# Patient Record
Sex: MALE | Race: WHITE | URBAN - METROPOLITAN AREA
[De-identification: names, ages, dates, MRNs, and addresses within clinical notes are randomized per-mention and may not be internally consistent; named-entity substitution may affect disease eponyms.]

---

## 2017-03-09 ENCOUNTER — TELEPHONE (OUTPATIENT)
Dept: FAMILY MEDICINE CLINIC | Facility: CLINIC | Age: 47
End: 2017-03-09

## 2017-03-09 RX ORDER — CEPHALEXIN 500 MG/1
1 CAPSULE ORAL 2 TIMES DAILY
Refills: 0 | COMMUNITY
Start: 2017-02-20 | End: 2017-04-13 | Stop reason: ALTCHOICE

## 2017-03-09 RX ORDER — TRAMADOL HYDROCHLORIDE 50 MG/1
1-2 TABLET ORAL EVERY 4 HOURS PRN
Refills: 0 | COMMUNITY
Start: 2017-02-20 | End: 2017-04-13 | Stop reason: ALTCHOICE

## 2017-03-09 RX ORDER — ALPRAZOLAM 0.5 MG/1
3 TABLET ORAL DAILY PRN
COMMUNITY
Start: 2016-10-06

## 2017-03-09 RX ORDER — ALBUTEROL SULFATE 90 UG/1
1-2 AEROSOL, METERED RESPIRATORY (INHALATION)
COMMUNITY
Start: 2016-10-06

## 2017-03-09 RX ORDER — LISINOPRIL 10 MG/1
1 TABLET ORAL DAILY
Refills: 11 | COMMUNITY
Start: 2017-03-03

## 2017-03-09 RX ORDER — FLUTICASONE PROPIONATE 50 MCG
2 SPRAY, SUSPENSION (ML) NASAL DAILY
COMMUNITY
Start: 2017-01-05

## 2017-03-09 RX ORDER — FEXOFENADINE HCL 180 MG/1
1 TABLET ORAL DAILY PRN
COMMUNITY
Start: 2016-10-06

## 2017-03-09 RX ORDER — RISPERIDONE 1 MG/ML
0.25 SOLUTION ORAL 2 TIMES DAILY
COMMUNITY
Start: 2016-10-06 | End: 2017-04-13

## 2017-03-09 RX ORDER — BLOOD-GLUCOSE METER
EACH MISCELLANEOUS
Refills: 2 | COMMUNITY
Start: 2017-02-28

## 2017-03-09 RX ORDER — TRAZODONE HYDROCHLORIDE 50 MG/1
1 TABLET ORAL NIGHTLY PRN
COMMUNITY
Start: 2016-10-06 | End: 2017-04-13 | Stop reason: ALTCHOICE

## 2017-03-09 RX ORDER — ATORVASTATIN CALCIUM 20 MG/1
1 TABLET, FILM COATED ORAL DAILY
COMMUNITY
Start: 2016-10-06

## 2017-03-09 RX ORDER — FLUOXETINE HYDROCHLORIDE 40 MG/1
2 CAPSULE ORAL 2 TIMES DAILY
COMMUNITY
Start: 2016-10-06 | End: 2019-11-26 | Stop reason: DRUGHIGH

## 2017-03-09 RX ORDER — OMEPRAZOLE 20 MG/1
1 CAPSULE, DELAYED RELEASE ORAL DAILY
COMMUNITY
Start: 2016-10-06

## 2017-03-09 RX ORDER — DIVALPROEX SODIUM 500 MG/1
3 TABLET, DELAYED RELEASE ORAL DAILY
COMMUNITY
Start: 2016-10-06

## 2017-03-09 NOTE — TELEPHONE ENCOUNTER
Office notes printed from [address] and faxed to April at [facility]  This is patient's cpap DME    Dallas Peek Minor, 03/09/2017, 5:11 PM

## 2017-04-13 ENCOUNTER — OFFICE VISIT (OUTPATIENT)
Dept: FAMILY MEDICINE CLINIC | Facility: CLINIC | Age: 47
End: 2017-04-13

## 2017-04-13 VITALS
HEIGHT: 67 IN | WEIGHT: 273 LBS | RESPIRATION RATE: 16 BRPM | SYSTOLIC BLOOD PRESSURE: 118 MMHG | BODY MASS INDEX: 42.85 KG/M2 | TEMPERATURE: 98 F | HEART RATE: 88 BPM | DIASTOLIC BLOOD PRESSURE: 76 MMHG

## 2017-04-13 DIAGNOSIS — G47.33 OBSTRUCTIVE SLEEP APNEA: Primary | ICD-10-CM

## 2017-04-13 PROCEDURE — 99214 OFFICE O/P EST MOD 30 MIN: CPT | Performed by: FAMILY MEDICINE

## 2017-04-13 RX ORDER — ERGOCALCIFEROL 1.25 MG/1
50000 CAPSULE ORAL WEEKLY
Refills: 11 | COMMUNITY
Start: 2017-03-15 | End: 2017-12-27

## 2017-04-13 RX ORDER — ASENAPINE 10 MG/1
10 TABLET SUBLINGUAL NIGHTLY
COMMUNITY
End: 2019-11-26

## 2017-04-14 NOTE — PROGRESS NOTES
[facility]        HPI:   This is a 56year old male coming in for Patient presents with:  Obstructive Sleep Apnea (JULIO)    HPI:     Pt  PCP:  Radha Persaud  No referring provider defined for this encounter.   Patient C Used                        Alcohol Use: No              Family History:  History reviewed. No pertinent family history.   Allergies:    No Known Allergies        Current Meds:    Current Outpatient Prescriptions:  ergocalciferol 01137 units Oral Cap Take 5 Constitutional: Negative. Respiratory: Negative. Cardiovascular: Negative. Gastrointestinal: Negative. Skin: Negative. Neurological: Negative.         EXAM:   /76 mmHg  Pulse 88  Temp(Src) 98.2 °F (36.8 °C) (Tympanic)  Resp 16  Ht 67\ to refrain from driving when sleepy. COMPLIANCE is required by insurance for 4 hours a night most nights of the week. Recommend weight loss, and maintain and optimal BMI with Exercise 30 minutes most days of the week to target heart rate .      Advised

## 2017-11-28 ENCOUNTER — OFFICE VISIT (OUTPATIENT)
Dept: FAMILY MEDICINE CLINIC | Facility: CLINIC | Age: 47
End: 2017-11-28

## 2017-11-28 VITALS
HEIGHT: 67 IN | HEART RATE: 72 BPM | RESPIRATION RATE: 20 BRPM | SYSTOLIC BLOOD PRESSURE: 128 MMHG | DIASTOLIC BLOOD PRESSURE: 72 MMHG | BODY MASS INDEX: 36.63 KG/M2 | WEIGHT: 233.38 LBS | TEMPERATURE: 98 F

## 2017-11-28 DIAGNOSIS — G47.33 OBSTRUCTIVE SLEEP APNEA: Primary | ICD-10-CM

## 2017-11-28 PROCEDURE — 99214 OFFICE O/P EST MOD 30 MIN: CPT | Performed by: FAMILY MEDICINE

## 2017-11-28 NOTE — PROGRESS NOTES
[facility]        HPI:   This is a 56year old male coming in for Patient presents with:  Obstructive Sleep Apnea (JULIO): 6 month sleep follow up      HPI:   Got a job in may and getting home at 1 am, and then crashin Family History:  Family History   Problem Relation Age of Onset   • No Known Problems Father    • Heart Disorder Mother      Allergies:    No Known Allergies        Current Meds:    Current Outpatient Prescriptions:  asenapine maleate (SAPHRIS) 5 MG Lna Constitutional: Negative. HENT: Negative. Eyes: Negative. Respiratory: Positive for apnea. Cardiovascular: Negative. Neurological: Negative. Psychiatric/Behavioral: Positive for sleep disturbance.        EXAM:   /72 (BP Location: L 1. Obstructive sleep apnea  Rest, fluids, hydrate,   Use mucinex DM for nasal congestin and cough. Vitamin c, zinc, and garlic to help your immune system. Tylenol and ibuprofen as appropriate. Call if not improving in 10-14 days to be re-seen.   Sal

## 2017-12-27 ENCOUNTER — OFFICE VISIT (OUTPATIENT)
Dept: FAMILY MEDICINE CLINIC | Facility: CLINIC | Age: 47
End: 2017-12-27

## 2017-12-27 VITALS
HEIGHT: 68 IN | SYSTOLIC BLOOD PRESSURE: 110 MMHG | DIASTOLIC BLOOD PRESSURE: 68 MMHG | RESPIRATION RATE: 16 BRPM | HEART RATE: 72 BPM | BODY MASS INDEX: 34.74 KG/M2 | TEMPERATURE: 98 F | WEIGHT: 229.19 LBS

## 2017-12-27 DIAGNOSIS — G47.33 OBSTRUCTIVE SLEEP APNEA: Primary | ICD-10-CM

## 2017-12-27 PROCEDURE — 94660 CPAP INITIATION&MGMT: CPT | Performed by: NURSE PRACTITIONER

## 2017-12-27 RX ORDER — A/SINGAPORE/GP1908/2015 IVR-180 (H1N1) (AN A/MICHIGAN/45/2015-LIKE VIRUS), A/SINGAPORE/GP2050/2015 (H3N2) (AN A/HONG KONG/4801/2014 - LIKE VIRUS), B/UTAH/9/2014 (A B/PHUKET/3073/2013-LIKE VIRUS), B/HONG KONG/259/2010 (A B/BRISBANE/60/08-LIKE VIRUS) 15; 15; 15; 15 UG/.5ML; UG/.5ML; UG/.5ML; UG/.5ML
INJECTION, SUSPENSION INTRAMUSCULAR
Refills: 0 | COMMUNITY
Start: 2017-12-15 | End: 2017-12-27 | Stop reason: ALTCHOICE

## 2017-12-27 NOTE — PATIENT INSTRUCTIONS
Recheck in one month. Increase compliance. Heat temp increased to 2. Let us know if wants higher. Warned if still with sleep apnea and not using CPAP has 7 fold increased risk and heart attack, stroke, abnormal heart rhythm, and death.   Increased r

## 2017-12-27 NOTE — PROGRESS NOTES
[facility]        HPI:   This is a 56year old male coming in for Patient presents with:  Obstructive Sleep Apnea (JULIO): 1 month f/u       HPI:   Present for CPAP follow-up.  Is not using the machine regularly due to Prescriptions:  asenapine maleate (SAPHRIS) 5 MG Sublingual SL Tab Place 5 mg under the tongue nightly.  Disp:  Rfl:    Albuterol Sulfate HFA (VENTOLIN HFA) 108 (90 Base) MCG/ACT Inhalation Aero Soln Inhale 1-2 puffs into the lungs every 4 to 6 hours as nee Left arm, Patient Position: Sitting, Cuff Size: large)   Pulse 72   Temp 98.2 °F (36.8 °C) (Temporal)   Resp 16   Ht 68\"   Wt 229 lb 3.2 oz   BMI 34.85 kg/m²  Estimated body mass index is 34.85 kg/m² as calculated from the following:    Height as of this accidents. Advised to refrain from driving when sleepy. Advised if still with sleep apnea and not using CPAP has a  7 fold increase in risk of heart attack, stroke, abnormal heart rhythm  and death,  increased risk of driving accidents.    Advised

## 2018-01-29 ENCOUNTER — OFFICE VISIT (OUTPATIENT)
Dept: FAMILY MEDICINE CLINIC | Facility: CLINIC | Age: 48
End: 2018-01-29

## 2018-01-29 VITALS
DIASTOLIC BLOOD PRESSURE: 76 MMHG | HEIGHT: 68 IN | RESPIRATION RATE: 16 BRPM | TEMPERATURE: 97 F | HEART RATE: 72 BPM | BODY MASS INDEX: 35.86 KG/M2 | SYSTOLIC BLOOD PRESSURE: 110 MMHG | WEIGHT: 236.63 LBS

## 2018-01-29 DIAGNOSIS — G47.33 OBSTRUCTIVE SLEEP APNEA: Primary | ICD-10-CM

## 2018-01-29 PROCEDURE — 94660 CPAP INITIATION&MGMT: CPT | Performed by: NURSE PRACTITIONER

## 2018-01-29 RX ORDER — ERGOCALCIFEROL 1.25 MG/1
CAPSULE ORAL
Refills: 11 | COMMUNITY
Start: 2018-01-06 | End: 2019-05-28

## 2018-01-29 RX ORDER — IBUPROFEN 800 MG/1
TABLET ORAL
COMMUNITY
Start: 2018-01-06 | End: 2019-11-26 | Stop reason: ALTCHOICE

## 2018-01-29 RX ORDER — CYCLOBENZAPRINE HCL 5 MG
5 TABLET ORAL AS NEEDED
COMMUNITY
Start: 2018-01-06 | End: 2019-11-26 | Stop reason: ALTCHOICE

## 2018-01-29 NOTE — PROGRESS NOTES
[facility]        HPI:   This is a 56year old male coming in for Patient presents with:  Obstructive Sleep Apnea (JLUIO): Sleep compliance f/u      HPI: Patient is present to review his sleep compliance.   States he ha Prescriptions:  Cyclobenzaprine HCl 5 MG Oral Tab Take 5 mg by mouth as needed. Disp:  Rfl:    asenapine maleate (SAPHRIS) 5 MG Sublingual SL Tab Place 5 mg under the tongue nightly.  Disp:  Rfl:    Albuterol Sulfate HFA (VENTOLIN HFA) 108 (90 Base) MCG/ACT Cardiovascular: Negative. Musculoskeletal: Negative. Skin: Negative. Neurological: Negative. Psychiatric/Behavioral: Negative.         EXAM:   /76   Pulse 72   Temp (!) 96.8 °F (36 °C) (Tympanic)   Resp 16   Ht 68\"   Wt 236 lb 9.6 oz sooner if problems. Warned if still with sleep apnea and not using CPAP has 7 fold increased risk and heart attack, stroke, abnormal heart rhythm, and death. Increased risk of driving accidents. Advised to refrain from driving when sleepy.          [facility]

## 2018-01-29 NOTE — PATIENT INSTRUCTIONS
Must work on improving compliance. Do not take the cyclobenzaprine without using the machine. Recheck in 1 month, sooner if problems.     Warned if still with sleep apnea and not using CPAP has 7 fold increased risk and heart attack, stroke, abnormal hear

## 2018-02-26 ENCOUNTER — OFFICE VISIT (OUTPATIENT)
Dept: FAMILY MEDICINE CLINIC | Facility: CLINIC | Age: 48
End: 2018-02-26

## 2018-02-26 VITALS
BODY MASS INDEX: 35.31 KG/M2 | HEART RATE: 68 BPM | HEIGHT: 68 IN | DIASTOLIC BLOOD PRESSURE: 72 MMHG | SYSTOLIC BLOOD PRESSURE: 126 MMHG | TEMPERATURE: 98 F | WEIGHT: 233 LBS

## 2018-02-26 DIAGNOSIS — G47.33 OBSTRUCTIVE SLEEP APNEA: Primary | ICD-10-CM

## 2018-02-26 PROCEDURE — 99213 OFFICE O/P EST LOW 20 MIN: CPT | Performed by: NURSE PRACTITIONER

## 2018-02-26 NOTE — PATIENT INSTRUCTIONS
Continue using CPAP. Recheck in 3 months. Sooner if needed. Warned if still with sleep apnea and not using CPAP has 7 fold increased risk and heart attack, stroke, abnormal heart rhythm, and death. Increased risk of driving accidents.   Advised to

## 2018-02-26 NOTE — PROGRESS NOTES
[facility]        HPI:   This is a 53year old male coming in for Patient presents with: Follow - Up: sleep - 1 month      HPI: Saqib Sandoval is present to review his CPAP compliance. He is now compliant using CPAP.   Sta Outpatient Prescriptions:  ergocalciferol 10675 units Oral Cap TK 1 C PO Q WEEK Disp:  Rfl: 11   ibuprofen 800 MG Oral Tab  Disp:  Rfl:    asenapine maleate (SAPHRIS) 5 MG Sublingual SL Tab Place 5 mg under the tongue nightly.  Disp:  Rfl:    Albuterol Sulf Negative. Cardiovascular: Negative. Musculoskeletal: Negative. Skin: Negative. Neurological: Negative. Psychiatric/Behavioral: Positive for sleep disturbance.        EXAM:   /72 (BP Location: Left arm, Patient Position: Sitting, Cuff Si Recheck in 3 months. Sooner if needed. Warned if still with sleep apnea and not using CPAP has 7 fold increased risk and heart attack, stroke, abnormal heart rhythm, and death. Increased risk of driving accidents.   Advised to refrain from driving

## 2018-10-20 RX ORDER — FLUTICASONE PROPIONATE 50 MCG
SPRAY, SUSPENSION (ML) NASAL
Refills: 0 | OUTPATIENT
Start: 2018-10-20

## 2019-05-28 ENCOUNTER — OFFICE VISIT (OUTPATIENT)
Dept: FAMILY MEDICINE CLINIC | Facility: CLINIC | Age: 49
End: 2019-05-28
Payer: MEDICARE

## 2019-05-28 VITALS
WEIGHT: 264.19 LBS | TEMPERATURE: 97 F | DIASTOLIC BLOOD PRESSURE: 78 MMHG | HEART RATE: 70 BPM | SYSTOLIC BLOOD PRESSURE: 126 MMHG | BODY MASS INDEX: 40.04 KG/M2 | RESPIRATION RATE: 18 BRPM | HEIGHT: 68 IN | OXYGEN SATURATION: 97 %

## 2019-05-28 DIAGNOSIS — R09.02 HYPOXEMIA: ICD-10-CM

## 2019-05-28 DIAGNOSIS — J45.50 SEVERE PERSISTENT ASTHMA WITHOUT COMPLICATION: ICD-10-CM

## 2019-05-28 DIAGNOSIS — J12.9 VIRAL PNEUMONIA: ICD-10-CM

## 2019-05-28 DIAGNOSIS — G47.33 OBSTRUCTIVE SLEEP APNEA: Primary | ICD-10-CM

## 2019-05-28 PROCEDURE — 99214 OFFICE O/P EST MOD 30 MIN: CPT | Performed by: FAMILY MEDICINE

## 2019-05-28 RX ORDER — BUPROPION HYDROCHLORIDE 150 MG/1
150 TABLET ORAL DAILY
COMMUNITY
Start: 2019-04-05 | End: 2019-11-26 | Stop reason: DRUGHIGH

## 2019-05-28 NOTE — PROGRESS NOTES
[facility]  [facility]        HPI:   This is a 51year old male coming in for Patient presents with:  Obstructive Sleep Apnea (JULIO): f/u      HPI: he was hospitalized in April and hospitalized for 6 days.    He is cleaning his cp Depression    • Diabetes ([facility] [address]    • Esophageal reflux    • Obesity    • Sleep apnea      No past surgical history on file.   Social History:  Social History    Social History Narrative      Not on file    Social History    Tobacco Use      Smoking status: N at bedtime Disp:  Rfl: 2   lisinopril 10 MG Oral Tab Take 1 tablet by mouth daily. Disp:  Rfl: 11   MetFORMIN HCl 500 MG Oral Tab Take 1 tablet by mouth 2 (two) times daily.  Disp:  Rfl: 5      Counseling given: Not Answered         Problem List:  Patient A are normal.   Neck: Normal range of motion. Neck supple. No thyromegaly present. Cardiovascular: Normal rate and regular rhythm. Exam reveals no friction rub. No murmur heard.   Pulmonary/Chest: Effort normal and breath sounds normal. No respiratory dis Prescriptions      No prescriptions requested or ordered in this encounter       Outcome: Parent verbalizes understanding. Parent is notified to call with any questions, complications, allergies, or worsening or changing symptoms.   Parent is to call with a

## 2019-11-26 ENCOUNTER — OFFICE VISIT (OUTPATIENT)
Dept: FAMILY MEDICINE CLINIC | Facility: CLINIC | Age: 49
End: 2019-11-26
Payer: MEDICARE

## 2019-11-26 VITALS
OXYGEN SATURATION: 99 % | BODY MASS INDEX: 39.4 KG/M2 | HEART RATE: 91 BPM | TEMPERATURE: 98 F | DIASTOLIC BLOOD PRESSURE: 72 MMHG | WEIGHT: 260 LBS | HEIGHT: 68 IN | SYSTOLIC BLOOD PRESSURE: 118 MMHG

## 2019-11-26 DIAGNOSIS — G47.33 OBSTRUCTIVE SLEEP APNEA: Primary | ICD-10-CM

## 2019-11-26 DIAGNOSIS — F25.9 SCHIZOAFFECTIVE DISORDER, UNSPECIFIED TYPE (HCC): ICD-10-CM

## 2019-11-26 PROCEDURE — 99214 OFFICE O/P EST MOD 30 MIN: CPT | Performed by: FAMILY MEDICINE

## 2019-11-26 RX ORDER — FLUOXETINE HYDROCHLORIDE 40 MG/1
80 CAPSULE ORAL DAILY
COMMUNITY
Start: 2019-11-15

## 2019-11-26 RX ORDER — BUPROPION HYDROCHLORIDE 300 MG/1
300 TABLET ORAL
COMMUNITY
Start: 2019-11-15

## 2019-11-26 RX ORDER — RISPERIDONE 2 MG/1
2 TABLET, FILM COATED ORAL NIGHTLY
COMMUNITY
Start: 2019-11-15

## 2019-11-26 NOTE — PROGRESS NOTES
[facility]  [facility]        HPI:   This is a 51year old male coming in for Patient presents with:  Sleep Problem      HPI:    he is cleaning his equipment regularly. Changing hoses mask and filters on time.    He is going to Medical History:   Diagnosis Date   • Allergic rhinitis    • Anxiety    • Asthma    • Depression    • Diabetes ([facility] [address]    • Esophageal reflux    • Obesity    • Sleep apnea      No past surgical history on file.   Social History:  Social History    Patient fernandez mouth daily. 11   • MetFORMIN HCl 500 MG Oral Tab Take 1 tablet by mouth 2 (two) times daily.   5      Counseling given: Not Answered         Problem List:  Patient Active Problem List:     Dysthymic disorder     Asthma     Familial multiple lipoprotein-ty rate, regular rhythm and normal heart sounds. Pulmonary/Chest: Effort normal and breath sounds normal.   Abdominal: Soft. Bowel sounds are normal. Musculoskeletal: Normal range of motion. General: No edema.      Neurological: He is alert and orien  Please excuse any grammatical errors. Call my office if you have any questions regarding this note.  \"     Jonn Duarte MD  11/26/2019  9:34 AM

## 2019-12-02 ENCOUNTER — TELEPHONE (OUTPATIENT)
Dept: FAMILY MEDICINE CLINIC | Facility: CLINIC | Age: 49
End: 2019-12-02

## 2019-12-02 NOTE — TELEPHONE ENCOUNTER
Spoke with patient. States this was done while he was sleeping. States he cannot say for sure what time he went to bed but it is usually sometime between 10-11pm.  Patient states he will do another study if Dr. Taisha Jacques wants him to. Please advise.

## 2019-12-02 NOTE — TELEPHONE ENCOUNTER
----- Message from Kanwal Pugh sent at 12/2/2019 11:51 AM CST -----  [facility]

## 2019-12-02 NOTE — TELEPHONE ENCOUNTER
Spoke with Liliana Noriega at [facility] who states this was daytime trend that was done at the hospital.  States patient had an overnight trend done in May, 2018. Liliana Noriega will fax the results for Dr. Mauro Thakkar to review.

## 2019-12-02 NOTE — TELEPHONE ENCOUNTER
Per last OV notes, patient was to contact [facility] to have his Overnight Oxygen Trend sent to our office. Received a fax from [facility] this morning with a daytime O2 trend done on 3/29/2019. Per Dr. Salomon Mendez notation \"Is patient wearing daytime O2?

## 2019-12-27 NOTE — TELEPHONE ENCOUNTER
Have not received overnight oxygenation trend. Please call [facility]. To obtain. If unable call [employer].

## 2020-01-02 NOTE — TELEPHONE ENCOUNTER
Finally received overnight oxygenation trend from [facility]. Pt with less than 5 minutes less than 89%   Does not need oxygen with his cpap.    Continue present managment

## 2020-05-11 ENCOUNTER — VIRTUAL PHONE E/M (OUTPATIENT)
Dept: FAMILY MEDICINE CLINIC | Facility: CLINIC | Age: 50
End: 2020-05-11
Payer: MEDICARE

## 2020-05-11 DIAGNOSIS — G47.33 OBSTRUCTIVE SLEEP APNEA: Primary | ICD-10-CM

## 2020-05-11 DIAGNOSIS — D64.9 ANEMIA, UNSPECIFIED TYPE: ICD-10-CM

## 2020-05-11 DIAGNOSIS — F34.1 DYSTHYMIC DISORDER: ICD-10-CM

## 2020-05-11 DIAGNOSIS — E55.9 VITAMIN D DEFICIENCY: ICD-10-CM

## 2020-05-11 PROCEDURE — 99443 PHONE E/M BY PHYS 21-30 MIN: CPT | Performed by: FAMILY MEDICINE

## 2020-05-11 NOTE — PROGRESS NOTES
[facility]  [facility]        HPI:   This is a 53year old male coming in for No chief complaint on file. HPI:  He is having some troubles with [place] of [place].    He has some dry mouth and difficulties with being • No Known Problems Father    • Heart Disorder Mother      Allergies:  No Known Allergies  Current Meds:  Current Outpatient Medications   Medication Sig Dispense Refill   • buPROPion HCl ER, XL, 300 MG Oral Tablet 24 Hr Take 300 mg by mouth daily with b Psychiatric/Behavioral: Positive for sleep disturbance. EXAM:   There were no vitals taken for this visit. Estimated body mass index is 39.53 kg/m² as calculated from the following:    Height as of 11/26/19: 68\".     Weight as of 11/26/19: 260 lb ( is required by insurance for 4 hours a night most nights of the week. Recommend weight loss, and maintain and optimal BMI with Exercise 30 minutes most days of the week to target heart rate .       Advised patient to change filters,masks,hoses  and tubes a

## 2021-03-02 ENCOUNTER — VIRTUAL PHONE E/M (OUTPATIENT)
Dept: FAMILY MEDICINE CLINIC | Facility: CLINIC | Age: 51
End: 2021-03-02
Payer: MEDICARE

## 2021-03-02 VITALS — WEIGHT: 270 LBS | BODY MASS INDEX: 41 KG/M2

## 2021-03-02 DIAGNOSIS — E78.49 FAMILIAL MULTIPLE LIPOPROTEIN-TYPE HYPERLIPIDEMIA: ICD-10-CM

## 2021-03-02 DIAGNOSIS — E55.9 VITAMIN D DEFICIENCY: ICD-10-CM

## 2021-03-02 DIAGNOSIS — G25.81 RLS (RESTLESS LEGS SYNDROME): ICD-10-CM

## 2021-03-02 DIAGNOSIS — F25.9 SCHIZOAFFECTIVE DISORDER, UNSPECIFIED TYPE (HCC): ICD-10-CM

## 2021-03-02 DIAGNOSIS — F34.1 DYSTHYMIC DISORDER: ICD-10-CM

## 2021-03-02 DIAGNOSIS — G47.33 OBSTRUCTIVE SLEEP APNEA: Primary | ICD-10-CM

## 2021-03-02 DIAGNOSIS — J45.909 UNCOMPLICATED ASTHMA, UNSPECIFIED ASTHMA SEVERITY, UNSPECIFIED WHETHER PERSISTENT: ICD-10-CM

## 2021-03-02 PROCEDURE — 99214 OFFICE O/P EST MOD 30 MIN: CPT | Performed by: FAMILY MEDICINE

## 2021-03-02 RX ORDER — PRAZOSIN HYDROCHLORIDE 1 MG/1
1 CAPSULE ORAL NIGHTLY
COMMUNITY
Start: 2021-02-04

## 2021-03-02 RX ORDER — ROPINIROLE 0.25 MG/1
1 TABLET, FILM COATED ORAL NIGHTLY
COMMUNITY
Start: 2021-02-27

## 2021-03-02 NOTE — PROGRESS NOTES
[facility]        HPI:   This is a 49year old male coming in for Patient presents with: Follow - Up: sleep      HPI: He is using his cpap nightly and going to bed earlier now that george is living with him.  And get History    Social History Narrative      Not on file    Social History    Tobacco Use      Smoking status: Never Smoker      Smokeless tobacco: Never Used    Alcohol use: No      Alcohol/week: 0.0 standard drinks    Drug use: No    Family History:  Family Dysthymic disorder     Asthma     Familial multiple lipoprotein-type hyperlipidemia     Schizoaffective disorder ([facility] [address]     Obstructive sleep apnea     Vitamin D deficiency      REVIEW OF SYSTEMS:   Review of Systems   Constitutional: Negative.     HENT: Neg with sleep apnea and not using CPAP has a  7 fold increase in risk of heart attack, stroke, abnormal heart rhythm  and death,  increased risk of driving accidents. Advised to refrain from driving when sleepy.     COMPLIANCE is required by insurance for 4 This note was created utilizing Dragon speech recognition software. Please excuse any grammatical errors. Call my office if you have any questions regarding this note.  \"     Deonna Cohen MD  3/2/2021  11:50 AM

## 2021-03-02 NOTE — PATIENT INSTRUCTIONS
Have primary care fax me:   CMP,   TSH,   T4,   CBC,  Iron profile with ferritin. b12 level  Vitamin D level.        Advised if still with sleep apnea and not using CPAP has a  7 fold increase in risk of heart attack, stroke, abnormal heart rhythm  and de

## 2022-07-05 ENCOUNTER — PATIENT OUTREACH (OUTPATIENT)
Dept: FAMILY MEDICINE CLINIC | Facility: CLINIC | Age: 52
End: 2022-07-05

## 2022-07-20 ENCOUNTER — PATIENT OUTREACH (OUTPATIENT)
Dept: FAMILY MEDICINE CLINIC | Facility: CLINIC | Age: 52
End: 2022-07-20

## 2022-07-20 NOTE — PROGRESS NOTES
left 2nd message with daughter reaching out to help schedule sleep follow up appointment, last seen 03-02-21

## 2022-07-29 ENCOUNTER — PATIENT OUTREACH (OUTPATIENT)
Dept: FAMILY MEDICINE CLINIC | Facility: CLINIC | Age: 52
End: 2022-07-29

## 2022-07-29 NOTE — PROGRESS NOTES
Patient is due for an appointment with ANKIT Rogers, or Dr Phyllis Phillips for sleep management. Last sleep management visit was 3/2/2021. Previous outreach contact was 2022 & 2022. Letter mailed to patient.

## 2022-08-29 ENCOUNTER — LAB ENCOUNTER (OUTPATIENT)
Dept: LAB | Age: 52
End: 2022-08-29
Attending: FAMILY MEDICINE
Payer: MEDICARE

## 2022-08-29 ENCOUNTER — OFFICE VISIT (OUTPATIENT)
Dept: FAMILY MEDICINE CLINIC | Facility: CLINIC | Age: 52
End: 2022-08-29
Payer: MEDICARE

## 2022-08-29 VITALS
DIASTOLIC BLOOD PRESSURE: 66 MMHG | BODY MASS INDEX: 37.28 KG/M2 | HEIGHT: 68 IN | WEIGHT: 246 LBS | RESPIRATION RATE: 18 BRPM | HEART RATE: 81 BPM | TEMPERATURE: 97 F | SYSTOLIC BLOOD PRESSURE: 118 MMHG | OXYGEN SATURATION: 98 %

## 2022-08-29 DIAGNOSIS — G25.81 RLS (RESTLESS LEGS SYNDROME): ICD-10-CM

## 2022-08-29 DIAGNOSIS — E55.9 VITAMIN D DEFICIENCY: ICD-10-CM

## 2022-08-29 DIAGNOSIS — G47.33 OBSTRUCTIVE SLEEP APNEA: Primary | ICD-10-CM

## 2022-08-29 DIAGNOSIS — M25.521 PAIN IN RIGHT ELBOW: ICD-10-CM

## 2022-08-29 DIAGNOSIS — E66.01 MORBID (SEVERE) OBESITY DUE TO EXCESS CALORIES (HCC): ICD-10-CM

## 2022-08-29 DIAGNOSIS — G47.19 EXCESSIVE DAYTIME SLEEPINESS: ICD-10-CM

## 2022-08-29 DIAGNOSIS — G47.33 OBSTRUCTIVE SLEEP APNEA: ICD-10-CM

## 2022-08-29 DIAGNOSIS — F25.9 SCHIZOAFFECTIVE DISORDER, UNSPECIFIED TYPE (HCC): ICD-10-CM

## 2022-08-29 DIAGNOSIS — F41.9 ANXIETY DISORDER, UNSPECIFIED TYPE: ICD-10-CM

## 2022-08-29 LAB
ALBUMIN SERPL-MCNC: 3.6 G/DL (ref 3.4–5)
ALBUMIN/GLOB SERPL: 0.9 {RATIO} (ref 1–2)
ALP LIVER SERPL-CCNC: 103 U/L
ALT SERPL-CCNC: 70 U/L
ANION GAP SERPL CALC-SCNC: 4 MMOL/L (ref 0–18)
AST SERPL-CCNC: 35 U/L (ref 15–37)
BASOPHILS # BLD AUTO: 0.06 X10(3) UL (ref 0–0.2)
BASOPHILS NFR BLD AUTO: 0.9 %
BILIRUB SERPL-MCNC: 0.4 MG/DL (ref 0.1–2)
BUN BLD-MCNC: 9 MG/DL (ref 7–18)
CALCIUM BLD-MCNC: 9 MG/DL (ref 8.5–10.1)
CHLORIDE SERPL-SCNC: 103 MMOL/L (ref 98–112)
CO2 SERPL-SCNC: 26 MMOL/L (ref 21–32)
CREAT BLD-MCNC: 1.02 MG/DL
DEPRECATED HBV CORE AB SER IA-ACNC: 23.4 NG/ML
EOSINOPHIL # BLD AUTO: 0.36 X10(3) UL (ref 0–0.7)
EOSINOPHIL NFR BLD AUTO: 5.5 %
ERYTHROCYTE [DISTWIDTH] IN BLOOD BY AUTOMATED COUNT: 12.7 %
FASTING STATUS PATIENT QL REPORTED: NO
GFR SERPLBLD BASED ON 1.73 SQ M-ARVRAT: 89 ML/MIN/1.73M2 (ref 60–?)
GLOBULIN PLAS-MCNC: 3.8 G/DL (ref 2.8–4.4)
GLUCOSE BLD-MCNC: 258 MG/DL (ref 70–99)
HCT VFR BLD AUTO: 43.7 %
HGB BLD-MCNC: 14 G/DL
IMM GRANULOCYTES # BLD AUTO: 0.02 X10(3) UL (ref 0–1)
IMM GRANULOCYTES NFR BLD: 0.3 %
IRON SATN MFR SERPL: 17 %
IRON SERPL-MCNC: 76 UG/DL
LDH SERPL L TO P-CCNC: 192 U/L
LYMPHOCYTES # BLD AUTO: 2.95 X10(3) UL (ref 1–4)
LYMPHOCYTES NFR BLD AUTO: 44.9 %
MCH RBC QN AUTO: 29.2 PG (ref 26–34)
MCHC RBC AUTO-ENTMCNC: 32 G/DL (ref 31–37)
MCV RBC AUTO: 91.2 FL
MONOCYTES # BLD AUTO: 0.52 X10(3) UL (ref 0.1–1)
MONOCYTES NFR BLD AUTO: 7.9 %
NEUTROPHILS # BLD AUTO: 2.66 X10 (3) UL (ref 1.5–7.7)
NEUTROPHILS # BLD AUTO: 2.66 X10(3) UL (ref 1.5–7.7)
NEUTROPHILS NFR BLD AUTO: 40.5 %
OSMOLALITY SERPL CALC.SUM OF ELEC: 284 MOSM/KG (ref 275–295)
PLATELET # BLD AUTO: 347 10(3)UL (ref 150–450)
POTASSIUM SERPL-SCNC: 3.9 MMOL/L (ref 3.5–5.1)
PROT SERPL-MCNC: 7.4 G/DL (ref 6.4–8.2)
RBC # BLD AUTO: 4.79 X10(6)UL
SODIUM SERPL-SCNC: 133 MMOL/L (ref 136–145)
TIBC SERPL-MCNC: 459 UG/DL (ref 240–450)
TRANSFERRIN SERPL-MCNC: 308 MG/DL (ref 200–360)
TSI SER-ACNC: 1.52 MIU/ML (ref 0.36–3.74)
VIT B12 SERPL-MCNC: 428 PG/ML (ref 193–986)
WBC # BLD AUTO: 6.6 X10(3) UL (ref 4–11)

## 2022-08-29 PROCEDURE — 82728 ASSAY OF FERRITIN: CPT

## 2022-08-29 PROCEDURE — 82607 VITAMIN B-12: CPT

## 2022-08-29 PROCEDURE — 83550 IRON BINDING TEST: CPT

## 2022-08-29 PROCEDURE — 84443 ASSAY THYROID STIM HORMONE: CPT

## 2022-08-29 PROCEDURE — 99214 OFFICE O/P EST MOD 30 MIN: CPT | Performed by: FAMILY MEDICINE

## 2022-08-29 PROCEDURE — 80053 COMPREHEN METABOLIC PANEL: CPT

## 2022-08-29 PROCEDURE — 85025 COMPLETE CBC W/AUTO DIFF WBC: CPT

## 2022-08-29 PROCEDURE — 83540 ASSAY OF IRON: CPT

## 2022-08-29 PROCEDURE — 36415 COLL VENOUS BLD VENIPUNCTURE: CPT

## 2022-08-29 PROCEDURE — 83615 LACTATE (LD) (LDH) ENZYME: CPT

## 2022-08-29 RX ORDER — GLIPIZIDE 10 MG/1
10 TABLET ORAL
COMMUNITY
Start: 2022-06-23

## 2022-08-29 RX ORDER — ATORVASTATIN CALCIUM 40 MG/1
TABLET, FILM COATED ORAL
COMMUNITY
Start: 2022-08-24

## 2022-08-29 RX ORDER — ZOLPIDEM TARTRATE 5 MG/1
TABLET ORAL
COMMUNITY
Start: 2022-08-24

## 2022-08-29 RX ORDER — TESTOSTERONE 20.25 MG/1.25G
2 GEL TOPICAL DAILY
COMMUNITY
Start: 2022-08-02

## 2022-08-29 RX ORDER — ARIPIPRAZOLE 5 MG/1
5 TABLET ORAL DAILY
COMMUNITY
Start: 2022-07-22

## 2022-08-29 RX ORDER — VITAMIN B COMPLEX
CAPSULE ORAL
COMMUNITY

## 2022-08-29 RX ORDER — CYCLOBENZAPRINE HCL 10 MG
10 TABLET ORAL
COMMUNITY
End: 2022-08-29 | Stop reason: ALTCHOICE

## 2022-08-29 RX ORDER — VENLAFAXINE HYDROCHLORIDE 75 MG/1
75 CAPSULE, EXTENDED RELEASE ORAL DAILY
COMMUNITY
Start: 2022-08-24

## 2022-08-30 ENCOUNTER — TELEPHONE (OUTPATIENT)
Dept: FAMILY MEDICINE CLINIC | Facility: CLINIC | Age: 52
End: 2022-08-30

## 2022-08-30 NOTE — TELEPHONE ENCOUNTER
----- Message from Laura Dorsey MD sent at 8/30/2022  8:16 AM CDT -----  Patient with uncontrolled diabetes,   Recommend appointment with PCP. This will add to his fatigue and poor sleep. Iron stores are low. [employer]. net  (0528 access number) for vitamins  Iron plus c twice a day x 3  Months and recheck iron. This may be contributing to RLS and PLMD.   Goal to treat to  Ferritin of 50 and Iron Saturation of 35%. Recommend colonoscopy and evaluation if not done   B12 level is ok.    Continue present managment

## 2022-10-14 ENCOUNTER — TELEPHONE (OUTPATIENT)
Dept: FAMILY MEDICINE CLINIC | Facility: CLINIC | Age: 52
End: 2022-10-14

## 2022-10-14 DIAGNOSIS — R79.0 LOW IRON STORES: Primary | ICD-10-CM

## 2022-10-14 NOTE — TELEPHONE ENCOUNTER
Discussed physician message regarding lab report from 8/29/22. Patient verbalizes understanding and agreement with plan. BW entered as ordered in physician message.

## (undated) NOTE — LETTER
07/29/22      [address] [facility] [place] [address]     Dear Dino Kolb,    We have attempted to contact you as you are due to follow up for your sleep disorders. Please call our office at [phone] to schedule this visit. If you have another provider managing your sleep disorders, please let us know so we can update your chart. We will be unable to sign certifications for sleep equipment until we have seen you in our office. Sleep well,      [facility].   Dr. Everardo Mckee.  ANKIT Gonzalez

## (undated) NOTE — LETTER
04/01/21        [address] [id number]      Dear Gildardo Mcclain,    [facility] records indicate that you have outstanding lab work and or testing that was ordered for you and has not yet been completed:  Orders Placed This Encounter      V

## (undated) NOTE — LETTER
02/09/21        [address] [id number]      Dear Tip Topete,    [address] records indicate that you have outstanding lab work and or testing that was ordered for you and has not yet been completed:  Orders Placed This Encounter      C

## (undated) NOTE — MR AVS SNAPSHOT
[address] [place]  [address] [address] [id number] [phone]               Thank you for choosing us for your health care visit with Dano Arevalo MD.  We are glad to serve you and happy to provide you with this summary of yo FLONASE ALLERGY RELIEF 50 MCG/ACT Susp   Generic drug:  Fluticasone Propionate   2 sprays by Each Nare route daily. lisinopril 10 MG Tabs   Take 1 tablet by mouth daily.    Commonly known as:  PRINIVIL,ZESTRIL           MetFORMIN HCl 500 MG Tabs Fully enjoy your food when eating. Don’t eat while distracted and slow down. Avoid over sized portions. Don’t eat while when you’re bored.      EAT THESE FOODS MORE OFTEN: EAT THESE FOODS LESS OFTEN:   Make half your plate fruits and vegetables Highly

## (undated) NOTE — LETTER
September 13, 2022      Margot Killian      Dear Luari Ramesh:    Our office has been trying to contact you to discuss your recent test results. Please contact our office at your earliest convenience at [phone]. Did you know that you can receive test result information and reminders securely through [address], which is available online or through the [employer] mobile kyrie. To register for an account, you can go to [email or web address]  and click \"Sign Up Now\" and use the \"Sign up Online\" link on the right. Follow the on-screen instructions to complete your registration. If at any time you are having difficulties, please contact our   [employer]  Support Monday through Friday from 7 a.m. to 7 p.m. or on Saturdays and Sundays from 7 a.m. to 3 p.m. at [phone], option 3. Look for the [employer] branded [employer] in [employer] or from [employer]:        As always, thank you for selecting [address] for your healthcare needs.     Sincerely,    Your Physician & Nursing Staff